# Patient Record
Sex: FEMALE | Race: OTHER | NOT HISPANIC OR LATINO
[De-identification: names, ages, dates, MRNs, and addresses within clinical notes are randomized per-mention and may not be internally consistent; named-entity substitution may affect disease eponyms.]

---

## 2019-02-22 ENCOUNTER — RESULT REVIEW (OUTPATIENT)
Age: 39
End: 2019-02-22

## 2019-03-16 ENCOUNTER — RESULT REVIEW (OUTPATIENT)
Age: 39
End: 2019-03-16

## 2020-01-08 ENCOUNTER — TRANSCRIPTION ENCOUNTER (OUTPATIENT)
Age: 40
End: 2020-01-08

## 2020-02-21 ENCOUNTER — TRANSCRIPTION ENCOUNTER (OUTPATIENT)
Age: 40
End: 2020-02-21

## 2020-04-07 ENCOUNTER — OUTPATIENT (OUTPATIENT)
Dept: OUTPATIENT SERVICES | Facility: HOSPITAL | Age: 40
LOS: 1 days | End: 2020-04-07
Payer: COMMERCIAL

## 2020-04-07 DIAGNOSIS — B34.9 VIRAL INFECTION, UNSPECIFIED: ICD-10-CM

## 2020-04-07 DIAGNOSIS — Z98.89 OTHER SPECIFIED POSTPROCEDURAL STATES: Chronic | ICD-10-CM

## 2020-04-07 LAB — SARS-COV-2 RNA SPEC QL NAA+PROBE: SIGNIFICANT CHANGE UP

## 2020-04-07 PROCEDURE — 87635 SARS-COV-2 COVID-19 AMP PRB: CPT

## 2020-04-25 ENCOUNTER — MESSAGE (OUTPATIENT)
Age: 40
End: 2020-04-25

## 2020-05-01 ENCOUNTER — APPOINTMENT (OUTPATIENT)
Dept: DISASTER EMERGENCY | Facility: HOSPITAL | Age: 40
End: 2020-05-01

## 2020-05-02 LAB
SARS-COV-2 IGG SERPL IA-ACNC: <0.1 INDEX
SARS-COV-2 IGG SERPL QL IA: NEGATIVE

## 2020-07-13 ENCOUNTER — RESULT REVIEW (OUTPATIENT)
Age: 40
End: 2020-07-13

## 2020-10-12 ENCOUNTER — TRANSCRIPTION ENCOUNTER (OUTPATIENT)
Age: 40
End: 2020-10-12

## 2020-10-17 ENCOUNTER — TRANSCRIPTION ENCOUNTER (OUTPATIENT)
Age: 40
End: 2020-10-17

## 2020-12-03 ENCOUNTER — TRANSCRIPTION ENCOUNTER (OUTPATIENT)
Age: 40
End: 2020-12-03

## 2021-11-30 ENCOUNTER — TRANSCRIPTION ENCOUNTER (OUTPATIENT)
Age: 41
End: 2021-11-30

## 2022-03-04 ENCOUNTER — NON-APPOINTMENT (OUTPATIENT)
Age: 42
End: 2022-03-04

## 2022-03-08 ENCOUNTER — APPOINTMENT (OUTPATIENT)
Dept: RADIOLOGY | Facility: CLINIC | Age: 42
End: 2022-03-08
Payer: COMMERCIAL

## 2022-03-08 ENCOUNTER — APPOINTMENT (OUTPATIENT)
Dept: NEUROSURGERY | Facility: CLINIC | Age: 42
End: 2022-03-08
Payer: COMMERCIAL

## 2022-03-08 ENCOUNTER — OUTPATIENT (OUTPATIENT)
Dept: OUTPATIENT SERVICES | Facility: HOSPITAL | Age: 42
LOS: 1 days | End: 2022-03-08
Payer: COMMERCIAL

## 2022-03-08 VITALS
HEIGHT: 63 IN | TEMPERATURE: 97.8 F | SYSTOLIC BLOOD PRESSURE: 135 MMHG | BODY MASS INDEX: 20.38 KG/M2 | DIASTOLIC BLOOD PRESSURE: 84 MMHG | HEART RATE: 74 BPM | OXYGEN SATURATION: 98 % | WEIGHT: 115 LBS

## 2022-03-08 DIAGNOSIS — M79.603 PAIN IN ARM, UNSPECIFIED: ICD-10-CM

## 2022-03-08 DIAGNOSIS — Z98.89 OTHER SPECIFIED POSTPROCEDURAL STATES: Chronic | ICD-10-CM

## 2022-03-08 DIAGNOSIS — M54.9 DORSALGIA, UNSPECIFIED: ICD-10-CM

## 2022-03-08 PROCEDURE — 72082 X-RAY EXAM ENTIRE SPI 2/3 VW: CPT | Mod: 26

## 2022-03-08 PROCEDURE — 99204 OFFICE O/P NEW MOD 45 MIN: CPT

## 2022-03-08 PROCEDURE — 72082 X-RAY EXAM ENTIRE SPI 2/3 VW: CPT

## 2022-03-09 NOTE — CONSULT LETTER
[Dear  ___] : Dear  [unfilled], [Courtesy Letter:] : I had the pleasure of seeing your patient, [unfilled], in my office today. [Sincerely,] : Sincerely, [FreeTextEntry1] : Ms. De Dios is a very pleasant 41-year-old female patient who was seen in our office today in regards to neck pain, mid back pain, low back pain, right-sided upper extremity pain, and right-sided leg pain.\par \par The patient endorses a longstanding history of low back pain dating back decades.  However, the patient became significantly worse after her fall in November 2020.  At that time, the patient started developing worsening right-sided upper extremity pain radiating into her first 2 digits as well as right-sided low back pain radiating down the back of her leg into the big toe on the right.  The patient's upper extremity symptoms are intermittent but occur multiple times a day.  The patient's low back pain is constant and present all the time.  The patient rates her pain at an 8/10 in severity.  The patient states that recumbency only helps minimally with her pain in the back.  The patient states that her right upper and lower extremity symptoms, while bothersome, are not as concerning as her generalized back pain.  The patient's back pain is primarily localized to the left and to the right of midline.  The patient states that any movement causes pain in this regard.  The patient has attempted physical therapy from December 2020 with no relief.  The patient has attempted chiropractic manipulation without relief.  The patient is currently being investigated by rheumatologist and a neurologist for these issues.\par \par The patient endorses a past medical history including rheumatoid arthritis, depression, and anxiety.  The patient currently takes ibuprofen and a muscle relaxant for pain management.  The patient denies any allergies to medications.\par \par On examination, the patient is alert, oriented, and compliant with the exam.  The patient demonstrates 5/5 strength in the upper and lower extremities bilaterally.  The patient does not demonstrate abnormal reflexes.\par \par The patient's most updated images of the lumbar spine were performed on February 7, 2022.  These images demonstrate a rostrally oriented disc herniation at L5/S1 which appears acute.  This disc is localized centrally that appears to abut the S1 nerve roots bilaterally.  The patient additionally has degenerative disc disease from L2-L5 with disc bulges and disc desiccation.  However, no nerve root compression is noted at these levels.  The patient also has a mention of a mild scoliosis on her reports.  This disc herniation is new compared to her previous MRI scan of the lumbar spine dated December 9, 2020.  The patient's most recent MRI scan of the cervical spine was performed on October 18, 2021.  These images demonstrate a right-sided disc osteophyte complex at C5/6. Although no overt compression is noted, this disc osteophyte complex does abut the C6 nerve root. The patient additionally has a C6/7 right-sided disc bulge to a lesser degree.  These images appears similar to her previous scans performed on November 30, 2020.  The patient's most recent MRI scan of the thoracic spine is dated May 27, 2021 which demonstrates mild degenerative changes with some Schmorl's nodes without overt nerve root or spinal cord compression.\par \par Taken together, I explained to the patient that her right-sided upper extremity and lower extremity symptoms may be explained by her MRI scan findings.  I counseled the patient that these MRI scan findings do not show ongoing compression of the associated nerve roots, but may be irritating a nerve root intermittently causing her symptoms.  The patient's description of numbness and pain down the right arm and leg are consistent with a C6 nerve root as well as an S1 nerve root which would be consistent with her imaging findings.  Apparently, the patient additionally obtained an EMG/nerve conduction study which were suggestive of radiculopathies but I do not have the reports available.  However, the timeline of her pain is a little less consistent with the disc herniation in the lumbar spine causing her new symptoms.  The patient is quite clear that her right-sided leg symptoms began in November 2020 after a fall but obtained MRI scans of the lumbar spine shortly after which did not demonstrate herniation.  The patient does not recall any sudden worsening of her pain since the onset of her pain in the right leg.  As such, I counseled the patient she has surgical lesions in the cervical and lumbar spine but the outcomes of surgery given the mild radiographic findings would be quite uncertain.  Again, the patient's major concern is less the upper and lower extremity symptoms, but rather her pain.  We focused the rest of the conversation today on her back pain which appears muscular in nature by description.  The patient has attempted physical therapy previously but has not obtained any significant relief.  I did  the patient that there are other neuromuscular disorders and genetic disorders that can cause chronic pain such as fibromyalgia and/or Clarissa-Danlos syndrome but these would be best investigated by her rheumatologist and neurologist.  The patient also has a pain management physician which would be more suitable for symptomatic management as opposed to diagnostics.  Finally, I have recommended a standing scoliosis film to rule out the possibility of significant sagittal or coronal imbalance as well as electromyographic studies specifically for the paraspinal musculature since this is where her pain largely resides.  I will be in contact with the patient once these tests are complete to review them with her but will otherwise be following up with this patient on an as-needed basis. [FreeTextEntry3] : Drew Loaiza MD, PhD, FRCSC, FAANS\par Attending Neurosurgeon\par  of Neurosurgery\par [facility] at \[facility]\""/[facility]\par [facility] [place]\par [address],\[place] [address]\par Office: [phone]\par Fax: [phone] full weight-bearing

## 2022-03-15 ENCOUNTER — NON-APPOINTMENT (OUTPATIENT)
Age: 42
End: 2022-03-15

## 2022-03-16 ENCOUNTER — NON-APPOINTMENT (OUTPATIENT)
Age: 42
End: 2022-03-16

## 2022-03-16 ENCOUNTER — APPOINTMENT (OUTPATIENT)
Dept: ORTHOPEDIC SURGERY | Facility: CLINIC | Age: 42
End: 2022-03-16
Payer: COMMERCIAL

## 2022-03-16 VITALS
HEIGHT: 63 IN | SYSTOLIC BLOOD PRESSURE: 127 MMHG | BODY MASS INDEX: 20.34 KG/M2 | WEIGHT: 114.8 LBS | HEART RATE: 91 BPM | DIASTOLIC BLOOD PRESSURE: 82 MMHG

## 2022-03-16 DIAGNOSIS — M51.34 OTHER INTERVERTEBRAL DISC DEGENERATION, THORACIC REGION: ICD-10-CM

## 2022-03-16 DIAGNOSIS — M51.26 OTHER INTERVERTEBRAL DISC DISPLACEMENT, LUMBAR REGION: ICD-10-CM

## 2022-03-16 DIAGNOSIS — M47.812 SPONDYLOSIS W/OUT MYELOPATHY OR RADICULOPATHY, CERVICAL REGION: ICD-10-CM

## 2022-03-16 PROCEDURE — 99204 OFFICE O/P NEW MOD 45 MIN: CPT

## 2022-03-16 RX ORDER — ALPRAZOLAM 2 MG/1
TABLET ORAL
Refills: 0 | Status: ACTIVE | COMMUNITY

## 2022-03-16 RX ORDER — METHOCARBAMOL 750 MG/1
TABLET, FILM COATED ORAL
Refills: 0 | Status: ACTIVE | COMMUNITY

## 2022-03-16 RX ORDER — ETANERCEPT 25 MG/.5ML
SOLUTION SUBCUTANEOUS
Refills: 0 | Status: ACTIVE | COMMUNITY

## 2022-03-16 RX ORDER — IBUPROFEN 800 MG/1
800 TABLET, FILM COATED ORAL
Refills: 0 | Status: ACTIVE | COMMUNITY

## 2022-04-13 ENCOUNTER — APPOINTMENT (OUTPATIENT)
Dept: GYNECOLOGIC ONCOLOGY | Facility: CLINIC | Age: 42
End: 2022-04-13
Payer: COMMERCIAL

## 2022-04-13 VITALS
HEART RATE: 75 BPM | RESPIRATION RATE: 16 BRPM | BODY MASS INDEX: 20.91 KG/M2 | SYSTOLIC BLOOD PRESSURE: 130 MMHG | WEIGHT: 118 LBS | DIASTOLIC BLOOD PRESSURE: 77 MMHG | OXYGEN SATURATION: 98 % | HEIGHT: 63 IN

## 2022-04-13 DIAGNOSIS — N92.0 EXCESSIVE AND FREQUENT MENSTRUATION WITH REGULAR CYCLE: ICD-10-CM

## 2022-04-13 DIAGNOSIS — Z78.9 OTHER SPECIFIED HEALTH STATUS: ICD-10-CM

## 2022-04-13 DIAGNOSIS — N92.6 IRREGULAR MENSTRUATION, UNSPECIFIED: ICD-10-CM

## 2022-04-13 DIAGNOSIS — N94.6 DYSMENORRHEA, UNSPECIFIED: ICD-10-CM

## 2022-04-13 PROCEDURE — 99204 OFFICE O/P NEW MOD 45 MIN: CPT | Mod: 25

## 2022-04-13 PROCEDURE — 76830 TRANSVAGINAL US NON-OB: CPT | Mod: 59

## 2022-04-13 PROCEDURE — 76857 US EXAM PELVIC LIMITED: CPT | Mod: 59

## 2022-04-18 NOTE — PHYSICAL EXAM
[Normal] : Bimanual Exam: Normal [de-identified] : Patient was interviewed and examined with chaperone present. Name of Chaperone: Marilyn Smith PA-C

## 2022-04-18 NOTE — END OF VISIT
[FreeTextEntry3] : Written by Tammy Smith PA-C, acting as a scribe for Dr. Yaakov Villar.\par  [FreeTextEntry2] : This note accurately reflects the work and decisions made by me.\par

## 2022-04-18 NOTE — ASSESSMENT
[FreeTextEntry1] : 42yo with dysmenorrhea and irregular bleeding. \par \par I discussed with patient that I do not feel medical management will cure her of above symptoms in light of prior finding of adenomyosis on US. I discussed with patient option of management with minimally invasive hysterectomy. Would recommend patient leave her ovaries in so as to not increase her all-cause mortality. Would recommend aleve in the interim for pain control. I advised patient fully heal from joint surgery prior to hysterectomy.

## 2022-04-18 NOTE — CHIEF COMPLAINT
[FreeTextEntry1] : [facility]\par \par [facility] [phone] at [address] [place] [id number]\par

## 2022-04-18 NOTE — REVIEW OF SYSTEMS
[Negative] : Musculoskeletal [Abn Vag Bleeding] : abnormal vaginal bleeding [Hematuria] : no hematuria [Dysuria] : no dysuria [Vaginal Discharge] : no vaginal discharge [Dyspareunia] : no dyspareunia [FreeTextEntry4] : dysmenorrhea.

## 2022-05-06 PROBLEM — M06.9 RHEUMATOID ARTHRITIS: Status: ACTIVE | Noted: 2022-05-06

## 2022-05-09 ENCOUNTER — APPOINTMENT (OUTPATIENT)
Dept: OBGYN | Facility: CLINIC | Age: 42
End: 2022-05-09
Payer: COMMERCIAL

## 2022-05-09 VITALS
HEART RATE: 101 BPM | WEIGHT: 118 LBS | BODY MASS INDEX: 20.91 KG/M2 | OXYGEN SATURATION: 100 % | HEIGHT: 63 IN | SYSTOLIC BLOOD PRESSURE: 133 MMHG | RESPIRATION RATE: 16 BRPM | DIASTOLIC BLOOD PRESSURE: 89 MMHG

## 2022-05-09 DIAGNOSIS — N94.89 OTHER SPECIFIED CONDITIONS ASSOCIATED WITH FEMALE GENITAL ORGANS AND MENSTRUAL CYCLE: ICD-10-CM

## 2022-05-09 DIAGNOSIS — N80.0 ENDOMETRIOSIS OF UTERUS: ICD-10-CM

## 2022-05-09 DIAGNOSIS — R10.2 PELVIC AND PERINEAL PAIN: ICD-10-CM

## 2022-05-09 DIAGNOSIS — M06.9 RHEUMATOID ARTHRITIS, UNSPECIFIED: ICD-10-CM

## 2022-05-09 DIAGNOSIS — D25.9 LEIOMYOMA OF UTERUS, UNSPECIFIED: ICD-10-CM

## 2022-05-09 PROCEDURE — 99205 OFFICE O/P NEW HI 60 MIN: CPT

## 2022-05-09 NOTE — DISCUSSION/SUMMARY
[FreeTextEntry1] : Exam as above. \par Patient has significant pelvic tenderness on exam as well as high pelvic floor tone. I do not palpate thickening of uterosacral ligaments and no rectovaginal nodules. \par Discussed getting an EMB, as the last endometrial sampling is from 3 years ago. \par Patient will schedule that appointment.\par Patient is interested in definitive surgery and not interested in pursuing any medical management for pain and abnormal bleeding.\par I reviewed the surgery with the patient and discussed the steps for the surgery. reviewed the risks of the procedure, including, but not limited to infection, bleeding, damage to internal organs and need for laparotomy/extensive surgery. Discussed that we may find endometriosis during the procedure, in which case, we will remove implants. Discussed that we may find significant adhesions, given previous PID and 2XCS.\par Will schedule for EUA, RA laparoscopic total hysterectomy, bilateral salpingectomy, possible resection of endometriosis, cystoscopy. \par I answered all her questions at length. \par Patient will follow up in 1 week for EMB\par Patient also interested in genetic testing.

## 2022-05-09 NOTE — PHYSICAL EXAM
[Chaperone Present] : A chaperone was present in the examining room during all aspects of the physical examination [Appropriately responsive] : appropriately responsive [Alert] : alert [No Acute Distress] : no acute distress [Soft] : soft [Non-tender] : non-tender [Non-distended] : non-distended [Oriented x3] : oriented x3 [Labia Majora] : normal [Labia Minora] : normal [Normal] : normal [Uterine Adnexae] : normal [No Tenderness] : no tenderness [FreeTextEntry1] : Miryam fraire [FreeTextEntry9] : No rectovaginal nodules palpated.

## 2022-05-09 NOTE — HISTORY OF PRESENT ILLNESS
[Patient reported mammogram was abnormal] : Patient reported mammogram was abnormal [Y] : Positive pregnancy history [Currently Active] : currently active [Mammogramdate] : 2021 [TextBox_19] : Microcalcifications found in right breast. Biopsy was performed. [BreastSonogramDate] : 2021 [LMPDate] : "End of April" [MensesFreq] : "Twice a month" [MensesLength] : 12 days [PGHxTotal] : 3 [[facility]Full[facility]] : 2 [[facility]Living] : 2 [PGHxABInduced] : 1 [FreeTextEntry1] : "End of April"

## 2022-05-09 NOTE — REASON FOR VISIT
[Initial] : an initial consultation for [Dysmenorrhea] : dysmenorrhea [Menorrhagia] : menorrhagia [FreeTextEntry1] : Dr. Villar

## 2022-05-23 ENCOUNTER — APPOINTMENT (OUTPATIENT)
Dept: OBGYN | Facility: CLINIC | Age: 42
End: 2022-05-23
Payer: COMMERCIAL

## 2022-05-23 VITALS
HEIGHT: 63 IN | SYSTOLIC BLOOD PRESSURE: 133 MMHG | WEIGHT: 118 LBS | OXYGEN SATURATION: 99 % | DIASTOLIC BLOOD PRESSURE: 79 MMHG | BODY MASS INDEX: 20.91 KG/M2 | RESPIRATION RATE: 16 BRPM | HEART RATE: 92 BPM

## 2022-05-23 PROCEDURE — 58100 BIOPSY OF UTERUS LINING: CPT

## 2022-05-23 NOTE — PROCEDURE
[Endometrial Biopsy] : Endometrial biopsy [Time out performed] : Pre-procedure time out performed.  Patient's name, date of birth and procedure confirmed. [Consent Obtained] : Consent obtained [Irregular Bleeding] : irregular uterine bleeding [Risks] : risks [Benefits] : benefits [Alternatives] : alternatives [Patient] : patient [Infection] : infection [Bleeding] : bleeding [Allergic Reaction] : allergic reaction [Uterine Perforation] : uterine perforation [Pain] : pain [Negative] : negative pregnancy test [Betadine] : Betadine [None] : none [Easy Passage] : Easy passage [Sounded to ___ cm] : sounded to [unfilled] ~Ucm [Anteverted] : anteverted [Moderate] : moderate [Sent to Pathology] : placed in buffered formalin and sent for pathology [Tolerated Well] : Patient tolerated the procedure well [No Complications] : No complications [de-identified] : Allis [de-identified] : Pipelle used to obtain tissue biopsy

## 2022-06-01 ENCOUNTER — APPOINTMENT (OUTPATIENT)
Dept: OBGYN | Facility: CLINIC | Age: 42
End: 2022-06-01
Payer: COMMERCIAL

## 2022-06-01 DIAGNOSIS — N93.9 ABNORMAL UTERINE AND VAGINAL BLEEDING, UNSPECIFIED: ICD-10-CM

## 2022-06-01 DIAGNOSIS — Z01.818 ENCOUNTER FOR OTHER PREPROCEDURAL EXAMINATION: ICD-10-CM

## 2022-06-01 PROCEDURE — 99213 OFFICE O/P EST LOW 20 MIN: CPT | Mod: 95

## 2022-06-02 ENCOUNTER — OUTPATIENT (OUTPATIENT)
Dept: OUTPATIENT SERVICES | Facility: HOSPITAL | Age: 42
LOS: 1 days | End: 2022-06-02
Payer: COMMERCIAL

## 2022-06-02 VITALS
WEIGHT: 117.95 LBS | RESPIRATION RATE: 20 BRPM | HEIGHT: 63 IN | HEART RATE: 86 BPM | DIASTOLIC BLOOD PRESSURE: 82 MMHG | OXYGEN SATURATION: 95 % | TEMPERATURE: 98 F | SYSTOLIC BLOOD PRESSURE: 122 MMHG

## 2022-06-02 DIAGNOSIS — F41.9 ANXIETY DISORDER, UNSPECIFIED: ICD-10-CM

## 2022-06-02 DIAGNOSIS — Z29.9 ENCOUNTER FOR PROPHYLACTIC MEASURES, UNSPECIFIED: ICD-10-CM

## 2022-06-02 DIAGNOSIS — Z98.89 OTHER SPECIFIED POSTPROCEDURAL STATES: Chronic | ICD-10-CM

## 2022-06-02 DIAGNOSIS — Z01.818 ENCOUNTER FOR OTHER PREPROCEDURAL EXAMINATION: ICD-10-CM

## 2022-06-02 DIAGNOSIS — R10.2 PELVIC AND PERINEAL PAIN: ICD-10-CM

## 2022-06-02 LAB
A1C WITH ESTIMATED AVERAGE GLUCOSE RESULT: 4.9 % — SIGNIFICANT CHANGE UP (ref 4–5.6)
ANION GAP SERPL CALC-SCNC: 12 MMOL/L — SIGNIFICANT CHANGE UP (ref 5–17)
APPEARANCE UR: CLEAR — SIGNIFICANT CHANGE UP
BILIRUB UR-MCNC: NEGATIVE — SIGNIFICANT CHANGE UP
BLD GP AB SCN SERPL QL: SIGNIFICANT CHANGE UP
BUN SERPL-MCNC: 9 MG/DL — SIGNIFICANT CHANGE UP (ref 8–20)
CALCIUM SERPL-MCNC: 9 MG/DL — SIGNIFICANT CHANGE UP (ref 8.6–10.2)
CHLORIDE SERPL-SCNC: 99 MMOL/L — SIGNIFICANT CHANGE UP (ref 98–107)
CO2 SERPL-SCNC: 26 MMOL/L — SIGNIFICANT CHANGE UP (ref 22–29)
COLOR SPEC: YELLOW — SIGNIFICANT CHANGE UP
CORE LAB BIOPSY: NORMAL
CREAT SERPL-MCNC: 0.55 MG/DL — SIGNIFICANT CHANGE UP (ref 0.5–1.3)
DIFF PNL FLD: NEGATIVE — SIGNIFICANT CHANGE UP
EGFR: 117 ML/MIN/1.73M2 — SIGNIFICANT CHANGE UP
ESTIMATED AVERAGE GLUCOSE: 94 MG/DL — SIGNIFICANT CHANGE UP (ref 68–114)
GLUCOSE SERPL-MCNC: 99 MG/DL — SIGNIFICANT CHANGE UP (ref 70–99)
GLUCOSE UR QL: NEGATIVE MG/DL — SIGNIFICANT CHANGE UP
HCG SERPL-ACNC: <4 MIU/ML — SIGNIFICANT CHANGE UP
HCT VFR BLD CALC: 42.3 % — SIGNIFICANT CHANGE UP (ref 34.5–45)
HGB BLD-MCNC: 13.8 G/DL — SIGNIFICANT CHANGE UP (ref 11.5–15.5)
KETONES UR-MCNC: NEGATIVE — SIGNIFICANT CHANGE UP
LEUKOCYTE ESTERASE UR-ACNC: NEGATIVE — SIGNIFICANT CHANGE UP
MCHC RBC-ENTMCNC: 28.6 PG — SIGNIFICANT CHANGE UP (ref 27–34)
MCHC RBC-ENTMCNC: 32.6 GM/DL — SIGNIFICANT CHANGE UP (ref 32–36)
MCV RBC AUTO: 87.6 FL — SIGNIFICANT CHANGE UP (ref 80–100)
NITRITE UR-MCNC: NEGATIVE — SIGNIFICANT CHANGE UP
PH UR: 7 — SIGNIFICANT CHANGE UP (ref 5–8)
PLATELET # BLD AUTO: 453 K/UL — HIGH (ref 150–400)
POTASSIUM SERPL-MCNC: 4.5 MMOL/L — SIGNIFICANT CHANGE UP (ref 3.5–5.3)
POTASSIUM SERPL-SCNC: 4.5 MMOL/L — SIGNIFICANT CHANGE UP (ref 3.5–5.3)
PROT UR-MCNC: NEGATIVE — SIGNIFICANT CHANGE UP
RBC # BLD: 4.83 M/UL — SIGNIFICANT CHANGE UP (ref 3.8–5.2)
RBC # FLD: 12.8 % — SIGNIFICANT CHANGE UP (ref 10.3–14.5)
SODIUM SERPL-SCNC: 137 MMOL/L — SIGNIFICANT CHANGE UP (ref 135–145)
SP GR SPEC: 1.01 — SIGNIFICANT CHANGE UP (ref 1.01–1.02)
UROBILINOGEN FLD QL: NEGATIVE MG/DL — SIGNIFICANT CHANGE UP
WBC # BLD: 7.34 K/UL — SIGNIFICANT CHANGE UP (ref 3.8–10.5)
WBC # FLD AUTO: 7.34 K/UL — SIGNIFICANT CHANGE UP (ref 3.8–10.5)

## 2022-06-02 PROCEDURE — G0463: CPT

## 2022-06-02 RX ORDER — METRONIDAZOLE 500 MG
500 TABLET ORAL ONCE
Refills: 0 | Status: COMPLETED | OUTPATIENT
Start: 2022-06-07 | End: 2022-06-07

## 2022-06-02 RX ORDER — CEFAZOLIN SODIUM 1 G
2000 VIAL (EA) INJECTION ONCE
Refills: 0 | Status: COMPLETED | OUTPATIENT
Start: 2022-06-07 | End: 2022-06-07

## 2022-06-02 NOTE — H&P PST ADULT - ASSESSMENT
42 yo female presents to PST for screening for a laporscopic hysterectomy.  Pt states having very heavy peroids  lasting over 2 weeks with intense pain and cramping.  Pt staes she is also having pain duriong intercourse. Pt states had a mirena which failed and got infected.  Pt states she has a 5/10-10/10 pain.  Pt scheduled for a robotic assisted laparoscopic hysterectomy bilateral salpingectomy possible resection of endometriosis cystoscopywith Dr. Nicholson on 6/7/2022    Pt vaccinated x3 will call for COVID test, 40 yo female presents to PST for screening for a laporscopic hysterectomy.  Pt states having very heavy peroids  lasting over 2 weeks with intense pain and cramping.  Pt staes she is also having pain duriong intercourse. Pt states had a mirena which failed and got infected.  Pt states she has a 5/10-10/10 pain.  Pt scheduled for a robotic assisted laparoscopic hysterectomy bilateral salpingectomy possible resection of endometriosis cystoscopy with Dr. Nicholson on 2022.    Pt given bowel prep instruction  Pt given ERAS instructions.    CAPRINI SCORE [CLOT]    AGE RELATED RISK FACTORS                                                       MOBILITY RELATED FACTORS  [x ] Age 41-60 years                                            (1 Point)                  [ ] Bed rest                                                        (1 Point)  [ ] Age: 61-74 years                                           (2 Points)                 [ ] Plaster cast                                                   (2 Points)  [ ] Age= 75 years                                              (3 Points)                 [ ] Bed bound for more than 72 hours                 (2 Points)    DISEASE RELATED RISK FACTORS                                               GENDER SPECIFIC FACTORS  [ ] Edema in the lower extremities                       (1 Point)                  [ ] Pregnancy                                                     (1 Point)  [ ] Varicose veins                                               (1 Point)                  [ ] Post-partum < 6 weeks                                   (1 Point)             [ ] BMI > 25 Kg/m2                                            (1 Point)                  [ ] Hormonal therapy  or oral contraception          (1 Point)                 [ ] Sepsis (in the previous month)                        (1 Point)                  [ ] History of pregnancy complications                 (1 point)  [ ] Pneumonia or serious lung disease                                               [ ] Unexplained or recurrent                     (1 Point)           (in the previous month)                               (1 Point)  [ ] Abnormal pulmonary function test                     (1 Point)                 SURGERY RELATED RISK FACTORS  [ ] Acute myocardial infarction                              (1 Point)                 [ ]  Section                                             (1 Point)  [ ] Congestive heart failure (in the previous month)  (1 Point)               [ ] Minor surgery                                                  (1 Point)   [ ] Inflammatory bowel disease                             (1 Point)                 [ ] Arthroscopic surgery                                        (2 Points)  [ ] Central venous access                                      (2 Points)                [ x] General surgery lasting more than 45 minutes   (2 Points)       [ ] Stroke (in the previous month)                          (5 Points)               [ ] Elective arthroplasty                                         (5 Points)                                                                                                                                               HEMATOLOGY RELATED FACTORS                                                 TRAUMA RELATED RISK FACTORS  [ ] Prior episodes of VTE                                     (3 Points)                [ ] Fracture of the hip, pelvis, or leg                       (5 Points)  [ ] Positive family history for VTE                         (3 Points)                 [ ] Acute spinal cord injury (in the previous month)  (5 Points)  [ ] Prothrombin 53736 A                                     (3 Points)                 [ ] Paralysis  (less than 1 month)                             (5 Points)  [ ] Factor V Leiden                                             (3 Points)                  [ ] Multiple Trauma within 1 month                        (5 Points)  [ ] Lupus anticoagulants                                     (3 Points)                                                           [ ] Anticardiolipin antibodies                               (3 Points)                                                       [ ] High homocysteine in the blood                      (3 Points)                                             [ ] Other congenital or acquired thrombophilia      (3 Points)                                                [ ] Heparin induced thrombocytopenia                  (3 Points)                                          Total Score [    3      ]    OPIOID RISK TOOL    NEELIMA EACH BOX THAT APPLIES AND ADD TOTALS AT THE END    FAMILY HISTORY OF SUBSTANCE ABUSE                 FEMALE         MALE                                                Alcohol                             [  ]1 pt          [  ]3pts                                               Illegal Durgs                     [  ]2 pts        [  ]3pts                                               Rx Drugs                           [  ]4 pts        [  ]4 pts    PERSONAL HISTORY OF SUBSTANCE ABUSE                                                                                          Alcohol                             [  ]3 pts       [  ]3 pts                                               Illegal Drugs                     [  ]4 pts        [  ]4 pts                                               Rx Drugs                           [  ]5 pts        [  ]5 pts    AGE BETWEEN 16-45 YEARS                                      [  ]1 pt         [  ]1 pt    HISTORY OF PREADOLESCENT   SEXUAL ABUSE                                                             [  ]3 pts        [  ]0pts    PSYCHOLOGICAL DISEASE                     ADD, OCD, Bipolar, Schizophrenia        [  ]2 pts         [  ]2 pts                      Depression                                               [  ]1 pt           [  ]1 pt           SCORING TOTAL   (add numbers and type here)   0                                      Pt vaccinated x3 will call surgeon office for COVID testing appointment

## 2022-06-02 NOTE — H&P PST ADULT - ATTENDING COMMENTS
Patient seen and evaluated  Scheduled for EUA, KRISTOPHER TL BS cystoscopy.   Discussed with the patient the risks of the procedure, including, but not limited to infection, bleeding, damage to internal organs, need for laparotomy/extensive surgery.   All questions answered.  Patient signed on consent.

## 2022-06-02 NOTE — H&P PST ADULT - HISTORY OF PRESENT ILLNESS
42 yo female presents to PST for screening for a laporscopic hysterectomy.  Pt states having very heavy peroids  lasting over 2 weeks with intense pain and cramping.  Pt staes she is also having pain duriong intercourse. Pt states had a mirena which failed and got infected.  Pt states she has a 5/10-10/10 pain.  Pt scheduled for a robotic assisted laparoscopic hysterectomy bilateral salpingectomy possible resection of endometriosis cystoscopywith Dr. Nicholson on 6/7/2022.

## 2022-06-02 NOTE — H&P PST ADULT - NSICDXPASTMEDICALHX_GEN_ALL_CORE_FT
PAST MEDICAL HISTORY:  Anxiety     Chronic back pain chronic DJD with epidural injs in past    Heavy menstrual bleeding     S/P  x 2

## 2022-06-02 NOTE — H&P PST ADULT - PROBLEM SELECTOR PLAN 1
Pt scheduled for a robotic assisted laparoscopic hysterectomy bilateral salpingectomy possible resection of endometriosis cystoscopy with Dr. Nicholson on 6/7/2022.

## 2022-06-03 LAB
CULTURE RESULTS: SIGNIFICANT CHANGE UP
SPECIMEN SOURCE: SIGNIFICANT CHANGE UP

## 2022-06-05 LAB — SARS-COV-2 N GENE NPH QL NAA+PROBE: NOT DETECTED

## 2022-06-07 ENCOUNTER — APPOINTMENT (OUTPATIENT)
Dept: OBGYN | Facility: HOSPITAL | Age: 42
End: 2022-06-07

## 2022-06-07 ENCOUNTER — TRANSCRIPTION ENCOUNTER (OUTPATIENT)
Age: 42
End: 2022-06-07

## 2022-06-07 ENCOUNTER — OUTPATIENT (OUTPATIENT)
Dept: OUTPATIENT SERVICES | Facility: HOSPITAL | Age: 42
LOS: 1 days | End: 2022-06-07
Payer: COMMERCIAL

## 2022-06-07 ENCOUNTER — RESULT REVIEW (OUTPATIENT)
Age: 42
End: 2022-06-07

## 2022-06-07 VITALS
OXYGEN SATURATION: 100 % | SYSTOLIC BLOOD PRESSURE: 110 MMHG | DIASTOLIC BLOOD PRESSURE: 54 MMHG | TEMPERATURE: 97 F | HEART RATE: 72 BPM | RESPIRATION RATE: 11 BRPM

## 2022-06-07 VITALS
TEMPERATURE: 98 F | HEART RATE: 76 BPM | DIASTOLIC BLOOD PRESSURE: 66 MMHG | OXYGEN SATURATION: 100 % | HEIGHT: 63 IN | SYSTOLIC BLOOD PRESSURE: 111 MMHG | RESPIRATION RATE: 15 BRPM | WEIGHT: 117.95 LBS

## 2022-06-07 DIAGNOSIS — Z98.89 OTHER SPECIFIED POSTPROCEDURAL STATES: Chronic | ICD-10-CM

## 2022-06-07 DIAGNOSIS — R10.2 PELVIC AND PERINEAL PAIN: ICD-10-CM

## 2022-06-07 PROCEDURE — 82962 GLUCOSE BLOOD TEST: CPT

## 2022-06-07 PROCEDURE — 88307 TISSUE EXAM BY PATHOLOGIST: CPT

## 2022-06-07 PROCEDURE — 58571 TLH W/T/O 250 G OR LESS: CPT

## 2022-06-07 PROCEDURE — 58662 LAPAROSCOPY EXCISE LESIONS: CPT | Mod: 59

## 2022-06-07 PROCEDURE — 36415 COLL VENOUS BLD VENIPUNCTURE: CPT

## 2022-06-07 PROCEDURE — S2900: CPT

## 2022-06-07 PROCEDURE — 88307 TISSUE EXAM BY PATHOLOGIST: CPT | Mod: 26

## 2022-06-07 RX ORDER — FENTANYL CITRATE 50 UG/ML
25 INJECTION INTRAVENOUS
Refills: 0 | Status: DISCONTINUED | OUTPATIENT
Start: 2022-06-07 | End: 2022-06-07

## 2022-06-07 RX ORDER — ALPRAZOLAM 0.25 MG
1 TABLET ORAL
Qty: 0 | Refills: 0 | DISCHARGE

## 2022-06-07 RX ORDER — SODIUM CHLORIDE 9 MG/ML
3 INJECTION INTRAMUSCULAR; INTRAVENOUS; SUBCUTANEOUS ONCE
Refills: 0 | Status: DISCONTINUED | OUTPATIENT
Start: 2022-06-07 | End: 2022-06-07

## 2022-06-07 RX ORDER — IBUPROFEN 200 MG
1 TABLET ORAL
Qty: 0 | Refills: 0 | DISCHARGE

## 2022-06-07 RX ORDER — ACETAMINOPHEN 500 MG
1000 TABLET ORAL ONCE
Refills: 0 | Status: COMPLETED | OUTPATIENT
Start: 2022-06-07 | End: 2022-06-07

## 2022-06-07 RX ORDER — SODIUM CHLORIDE 9 MG/ML
1000 INJECTION, SOLUTION INTRAVENOUS
Refills: 0 | Status: DISCONTINUED | OUTPATIENT
Start: 2022-06-07 | End: 2022-06-07

## 2022-06-07 RX ORDER — OXYCODONE HYDROCHLORIDE 5 MG/1
5 TABLET ORAL ONCE
Refills: 0 | Status: DISCONTINUED | OUTPATIENT
Start: 2022-06-07 | End: 2022-06-07

## 2022-06-07 RX ORDER — OXYCODONE HYDROCHLORIDE 5 MG/1
1 TABLET ORAL
Qty: 10 | Refills: 0
Start: 2022-06-07

## 2022-06-07 RX ORDER — METHOCARBAMOL 500 MG/1
2 TABLET, FILM COATED ORAL
Qty: 0 | Refills: 0 | DISCHARGE

## 2022-06-07 RX ORDER — ACETAMINOPHEN 500 MG
975 TABLET ORAL ONCE
Refills: 0 | Status: COMPLETED | OUTPATIENT
Start: 2022-06-07 | End: 2022-06-07

## 2022-06-07 RX ORDER — KETOROLAC TROMETHAMINE 30 MG/ML
30 SYRINGE (ML) INJECTION ONCE
Refills: 0 | Status: DISCONTINUED | OUTPATIENT
Start: 2022-06-07 | End: 2022-06-07

## 2022-06-07 RX ADMIN — FENTANYL CITRATE 25 MICROGRAM(S): 50 INJECTION INTRAVENOUS at 15:10

## 2022-06-07 RX ADMIN — OXYCODONE HYDROCHLORIDE 5 MILLIGRAM(S): 5 TABLET ORAL at 15:40

## 2022-06-07 RX ADMIN — FENTANYL CITRATE 25 MICROGRAM(S): 50 INJECTION INTRAVENOUS at 15:54

## 2022-06-07 RX ADMIN — FENTANYL CITRATE 25 MICROGRAM(S): 50 INJECTION INTRAVENOUS at 15:28

## 2022-06-07 RX ADMIN — Medication 30 MILLIGRAM(S): at 15:10

## 2022-06-07 RX ADMIN — Medication 30 MILLIGRAM(S): at 15:35

## 2022-06-07 RX ADMIN — Medication 100 MILLIGRAM(S): at 12:10

## 2022-06-07 RX ADMIN — FENTANYL CITRATE 25 MICROGRAM(S): 50 INJECTION INTRAVENOUS at 15:38

## 2022-06-07 RX ADMIN — FENTANYL CITRATE 25 MICROGRAM(S): 50 INJECTION INTRAVENOUS at 15:39

## 2022-06-07 RX ADMIN — Medication 200 MILLIGRAM(S): at 12:15

## 2022-06-07 RX ADMIN — Medication 400 MILLIGRAM(S): at 16:25

## 2022-06-07 RX ADMIN — FENTANYL CITRATE 25 MICROGRAM(S): 50 INJECTION INTRAVENOUS at 15:49

## 2022-06-07 RX ADMIN — Medication 975 MILLIGRAM(S): at 09:35

## 2022-06-07 RX ADMIN — FENTANYL CITRATE 25 MICROGRAM(S): 50 INJECTION INTRAVENOUS at 15:20

## 2022-06-07 RX ADMIN — OXYCODONE HYDROCHLORIDE 5 MILLIGRAM(S): 5 TABLET ORAL at 16:42

## 2022-06-07 RX ADMIN — FENTANYL CITRATE 25 MICROGRAM(S): 50 INJECTION INTRAVENOUS at 16:49

## 2022-06-07 NOTE — BRIEF OPERATIVE NOTE - OPERATION/FINDINGS
normal appearing external genitalia, enlarged uterus, normal appearing bilateral adnexae. uterus adherent to the anterior abdominal wall. Bylateral UO jets identified on cystoscopy

## 2022-06-07 NOTE — ASU DISCHARGE PLAN (ADULT/PEDIATRIC) - PAIN MANAGEMENT
Please take Tylenol and Ibuprofen as needed over the counter./Prescriptions electronically submitted to pharmacy from [facility]

## 2022-06-07 NOTE — ASU DISCHARGE PLAN (ADULT/PEDIATRIC) - CARE PROVIDER_API CALL
Salome Nicholson)  Obstetrics and Gynecology  [address] [place] [address]  Phone: [phone]  Fax: [phone]  Follow Up Time:

## 2022-06-07 NOTE — ASU DISCHARGE PLAN (ADULT/PEDIATRIC) - NS MD DC FALL RISK RISK
For information on Fall & Injury Prevention, visit: [email or web address] OR  [email or web address] OR  https://www.cdc.gov/steadi/patient.html

## 2022-06-07 NOTE — ASU DISCHARGE PLAN (ADULT/PEDIATRIC) - CALL YOUR DOCTOR IF YOU HAVE ANY OF THE FOLLOWING:
Bleeding that does not stop/Pain not relieved by Medications/Fever greater than (need to indicate Fahrenheit or Celsius)/Wound/Surgical Site with redness, or foul smelling discharge or pus Bleeding that does not stop/Pain not relieved by Medications/Fever greater than (need to indicate Fahrenheit or Celsius)/Wound/Surgical Site with redness, or foul smelling discharge or pus/Nausea and vomiting that does not stop/Unable to urinate/Inability to tolerate liquids or foods

## 2022-06-08 ENCOUNTER — NON-APPOINTMENT (OUTPATIENT)
Age: 42
End: 2022-06-08

## 2022-06-20 ENCOUNTER — NON-APPOINTMENT (OUTPATIENT)
Age: 42
End: 2022-06-20

## 2022-06-22 ENCOUNTER — APPOINTMENT (OUTPATIENT)
Dept: NEUROSURGERY | Facility: CLINIC | Age: 42
End: 2022-06-22

## 2022-06-27 ENCOUNTER — APPOINTMENT (OUTPATIENT)
Dept: OBGYN | Facility: CLINIC | Age: 42
End: 2022-06-27

## 2022-06-27 VITALS
RESPIRATION RATE: 16 BRPM | OXYGEN SATURATION: 98 % | BODY MASS INDEX: 20.91 KG/M2 | HEIGHT: 63 IN | TEMPERATURE: 98.4 F | DIASTOLIC BLOOD PRESSURE: 89 MMHG | HEART RATE: 79 BPM | WEIGHT: 118 LBS | SYSTOLIC BLOOD PRESSURE: 130 MMHG

## 2022-06-27 PROCEDURE — 99024 POSTOP FOLLOW-UP VISIT: CPT

## 2022-06-27 NOTE — HISTORY OF PRESENT ILLNESS
[Pain is well-controlled] : pain is well-controlled [Fever] : no fever [Chills] : no chills [Nausea] : no nausea [Vomiting] : no vomiting [Diarrhea] : no diarrhea [Vaginal Bleeding] : no vaginal bleeding [Pelvic Pressure] : no pelvic pressure [Dysuria] : no dysuria [Vaginal Discharge] : no vaginal discharge [Constipation] : no constipation [Clean/Dry/Intact] : clean, dry and intact [Erythema] : not erythematous [Swelling] : not swollen [Dehiscence] : not dehisced [Healed] : healed [Discharge] : absent of discharge [Pathology reviewed] : pathology reviewed [de-identified] : 20 [de-identified] : KRISTOPHER FORDE BS CYSTOSCOPY [de-identified] : JAYNE [de-identified] : Excellent post operative status.\par Patient is overall feeling well.\par Ambulating, tolerating diet.

## 2022-06-27 NOTE — PLAN
[No Halesite] : to avoid sexual intercourse [No Tampons/Suppositories] : against using tampons or vaginal suppositories [Limited ADLs] : to participate in activities of daily living with limitations [No Work] : not to work [No Housework] : not to do housework [No Sports] : not to participate in sports [FreeTextEntry1] : Overall excellent post operative status.\par Pathology benign. \par Follow up in 3-5 weeks

## 2022-08-01 ENCOUNTER — APPOINTMENT (OUTPATIENT)
Dept: OBGYN | Facility: CLINIC | Age: 42
End: 2022-08-01

## 2022-08-01 VITALS
OXYGEN SATURATION: 97 % | HEART RATE: 108 BPM | WEIGHT: 115 LBS | SYSTOLIC BLOOD PRESSURE: 129 MMHG | DIASTOLIC BLOOD PRESSURE: 83 MMHG | BODY MASS INDEX: 20.38 KG/M2 | HEIGHT: 63 IN

## 2022-08-01 LAB
BILIRUB UR QL STRIP: NORMAL
CLARITY UR: CLEAR
COLLECTION METHOD: NORMAL
GLUCOSE UR-MCNC: NORMAL
HCG UR QL: 0.2 EU/DL
HGB UR QL STRIP.AUTO: NORMAL
KETONES UR-MCNC: NORMAL
LEUKOCYTE ESTERASE UR QL STRIP: NORMAL
NITRITE UR QL STRIP: NORMAL
PH UR STRIP: 7
PROT UR STRIP-MCNC: NORMAL
SP GR UR STRIP: 1.02

## 2022-08-01 PROCEDURE — 81003 URINALYSIS AUTO W/O SCOPE: CPT | Mod: QW

## 2022-08-01 PROCEDURE — 99024 POSTOP FOLLOW-UP VISIT: CPT

## 2022-08-01 NOTE — HISTORY OF PRESENT ILLNESS
[Pain is well-controlled] : pain is well-controlled [Fever] : no fever [Chills] : no chills [Nausea] : no nausea [Vomiting] : no vomiting [Diarrhea] : no diarrhea [Vaginal Bleeding] : no vaginal bleeding [Pelvic Pressure] : no pelvic pressure [Dysuria] : no dysuria [Vaginal Discharge] : no vaginal discharge [Clean/Dry/Intact] : clean, dry and intact [Erythema] : not erythematous [Swelling] : not swollen [Dehiscence] : not dehisced [Healed] : healed [Discharge] : absent of discharge [None] : no vaginal bleeding [Normal] : normal [Pathology reviewed] : pathology reviewed [de-identified] : 54 [de-identified] : Robotic assisted laparoscopic total hysterectomy bilateral salpingectomy and cystoscopy [de-identified] : Abnormal uterine bleeding [de-identified] : Patient is doing well postoperatively.\par She has no complaints.\par She is ambulating, tolerating diet well, and overall doing well.\par She reports back pain that she has had in the past.  With this surgery the back pain increased to some degree due to lack of abdominal muscle exercises. [de-identified] : Vaginal cuff healed.  Noted 1 suture that is still present.

## 2022-08-01 NOTE — PLAN
[No Sanders] : to avoid sexual intercourse [No Tampons/Suppositories] : against using tampons or vaginal suppositories [Unlimited ADLs] : to participate in activities of daily living without limitations as pain allows [Unlimited Work] : to work without limitations [Unlimited Housework] : to do housework without limitations [Unlimited Sports] : to participate in sports without limitations [FreeTextEntry1] : Patient overall doing well postoperatively.  She has no pain and no complaints.\par Follow-up in 6 weeks.

## 2022-10-03 ENCOUNTER — APPOINTMENT (OUTPATIENT)
Dept: OBGYN | Facility: CLINIC | Age: 42
End: 2022-10-03

## 2022-10-03 VITALS
BODY MASS INDEX: 20.91 KG/M2 | HEIGHT: 63 IN | RESPIRATION RATE: 16 BRPM | DIASTOLIC BLOOD PRESSURE: 75 MMHG | WEIGHT: 118 LBS | SYSTOLIC BLOOD PRESSURE: 111 MMHG | HEART RATE: 83 BPM | OXYGEN SATURATION: 98 %

## 2022-10-03 DIAGNOSIS — Z09 ENCOUNTER FOR FOLLOW-UP EXAMINATION AFTER COMPLETED TREATMENT FOR CONDITIONS OTHER THAN MALIGNANT NEOPLASM: ICD-10-CM

## 2022-10-03 PROCEDURE — 99212 OFFICE O/P EST SF 10 MIN: CPT

## 2022-10-03 NOTE — HISTORY OF PRESENT ILLNESS
[Pain is well-controlled] : pain is well-controlled [Fever] : no fever [Chills] : no chills [Nausea] : no nausea [Vomiting] : no vomiting [Diarrhea] : no diarrhea [Vaginal Bleeding] : no vaginal bleeding [Pelvic Pressure] : no pelvic pressure [Dysuria] : no dysuria [Vaginal Discharge] : no vaginal discharge [Constipation] : no constipation [Clean/Dry/Intact] : clean, dry and intact [Erythema] : not erythematous [Swelling] : not swollen [Dehiscence] : not dehisced [Healed] : healed [Discharge] : absent of discharge [None] : no vaginal bleeding [Normal] : normal [Pathology reviewed] : pathology reviewed [de-identified] : 118 [de-identified] : KRISTOPHER FORDE BS cystoscopy [de-identified] : Abnormal uterine bleeding [de-identified] : Sujata is doing excellent postoperatively.\par She has overall been active and doing well.\par She reports minimal discomfort on the left upper quadrant incision.  She has no severe pain, no constipation or other symptoms. [de-identified] : Vaginal cuff intact on observation and palpation.  Abdomen is soft and nontender, no masses palpated anywhere in the abdomen.

## 2022-10-03 NOTE — PLAN
[None] : None [FreeTextEntry1] : Overall excellent postoperative state.\marina Ernst is discharged back for follow-up with her primary GYN.

## 2022-10-03 NOTE — CONSULT LETTER
[Dear  ___] : Dear  [unfilled], [Consult Letter:] : I had the pleasure of evaluating your patient, [unfilled]. [FreeTextEntry1] : Thank you for your kind referral. \par Attached is the visit note.\par Please do not hesitate to call with any question.\par \par Salome Nicholson MD, FACOG, [facility]\par [facility]\par [address], [address]\[place], [place], [address]\par [phone]\par

## 2023-03-09 NOTE — H&P PST ADULT - AIRWAY
Problem: Impaired Physical Mobility  Goal: # Bed mobility, ambulation, and ADLs are maintained or returned to baseline during hospitalization  Outcome: Outcome Met, Continue evaluating goal progress toward completion     Problem: Activity Intolerance  Goal: # Functional status is maintained or returned to baseline  Outcome: Outcome Met, Continue evaluating goal progress toward completion  Goal: # Tolerates activity for d/c setting with no clinical problems  Outcome: Outcome Met, Continue evaluating goal progress toward completion      normal

## 2024-12-18 NOTE — HISTORY OF PRESENT ILLNESS
[FreeTextEntry1] : 40yo  LMP  presents today with referral from Dr. Burnham for dysmenorrhea & irregular heavy periods since . Pt  reports period to come every two weeks. She also reports history of adenomyosis seen on US in 2021 with [facility], we were not able to obtain those records at time of visit. She reports history of 3 D&C's in the past with most recent in , reported to be all benign Patient with mirena IUD placed in  with subsequent development of PID requiring hospitalization in . She reports frequent vaginal infections and UTI's since. Patient is wishing to simplify her GYN care at this point and is desiring definitive management with hysterectomy. She is not desiring any future pregnancies. CT A/P performed 3/7/22 revealed left ovary with numerous prominent follicles, due to patients age doubtful significance.  She denies any issues with bowel/bladder habits. \par \par LPAP- March negative\par LMammo- 2022, repeat ordered for next week due to calcifications\par  No

## 2025-02-28 NOTE — PRE-OP CHECKLIST - HAND OFF
Discharge time/date: 02/28/2025  Walked or Wheelchair: walked out with family  PIV removed: yes  Reviewed AVS with patient: yes  Medication due times added to AVS in EPIC: yes  Verbalized understanding of discharge with teachback: Yes  Medications retrieved from pharmacy: CARLYLE  Supplies sent home: CARLYLE  Belongings from security with patient: CARLYLE    Unit RN to OR RN

## 2025-04-12 ENCOUNTER — NON-APPOINTMENT (OUTPATIENT)
Age: 45
End: 2025-04-12

## (undated) DEVICE — XI DRAPE COLUMN

## (undated) DEVICE — GLV 7.5 PROTEXIS

## (undated) DEVICE — XI SEALER DA VINCI VESSEL

## (undated) DEVICE — BAG TISSUE RETRIEVAL ROBOTIC STERILE 8MM

## (undated) DEVICE — SOL IRR POUR H2O 1000ML

## (undated) DEVICE — ELCTR GROUNDING PAD ADULT COVIDIEN

## (undated) DEVICE — TROCAR COVIDIEN VERSAONE OPTICAL BLADELESS 5MM

## (undated) DEVICE — SOL INJ NS 0.9% 100ML

## (undated) DEVICE — RUMI TIP ORANGE 6.7MMX12CM DISP

## (undated) DEVICE — DRAPE ROBOTIC

## (undated) DEVICE — RUMI TIP WHITE 6.7MM X 6CM DISP

## (undated) DEVICE — BALLOON COLPO-PNEUMO OCCLUDER

## (undated) DEVICE — POSITIONER FOAM EGG CRATE ULNAR (2PCS)

## (undated) DEVICE — XI DRAPE ARM

## (undated) DEVICE — PREP BETADINE SPONGE STICKS

## (undated) DEVICE — SOL IRR POUR NS 0.9% 1000ML

## (undated) DEVICE — DRSG OPSITE POSTOP 2.5"X2"

## (undated) DEVICE — LIGASURE ATLAS 10MM 20CM

## (undated) DEVICE — SUT VICRYL 4-0 18" PS-2 UNDYED

## (undated) DEVICE — DEVICE PUREVIEW ACTIVE

## (undated) DEVICE — COVER TIP INTUITIVE W INSERTION TOOL

## (undated) DEVICE — PREP CHLORAPREP ORANGE 2PCT 26ML

## (undated) DEVICE — ELCTR CORD FOOTSWITCH 1PLR LAPSCP 10FT

## (undated) DEVICE — XI SEAL UNIV 5- 8 MM

## (undated) DEVICE — RUMI TIP BLUE 6.7MM X 8CM DISP

## (undated) DEVICE — PACK ROBOTIC

## (undated) DEVICE — SUT VLOC 180 0 9" GS-21 GREEN

## (undated) DEVICE — WRAP COMPRESSION CALF MED

## (undated) DEVICE — RUMI TIP GREEN 6.7MMX10CM DISP

## (undated) DEVICE — RUMI TIP LAVENDER 5.1MMX6CM DISP

## (undated) DEVICE — POSITIONER PINK PAD PIGAZZI SYSTEM

## (undated) DEVICE — DRSG KERLIX ROLL 4.5"

## (undated) DEVICE — BLANKET WARMER UPPER ADULT

## (undated) DEVICE — LIGASURE BLUNT TIP NANO CTD 37CM